# Patient Record
Sex: MALE | Race: WHITE | NOT HISPANIC OR LATINO | Employment: FULL TIME | URBAN - METROPOLITAN AREA
[De-identification: names, ages, dates, MRNs, and addresses within clinical notes are randomized per-mention and may not be internally consistent; named-entity substitution may affect disease eponyms.]

---

## 2022-09-02 ENCOUNTER — TRANSFERRED RECORDS (OUTPATIENT)
Dept: HEALTH INFORMATION MANAGEMENT | Facility: CLINIC | Age: 47
End: 2022-09-02

## 2022-09-02 LAB
ALT SERPL-CCNC: 39 U/L (ref 7–55)
AST SERPL-CCNC: 29 U/L (ref 10–50)
CHOLESTEROL (EXTERNAL): 239 MG/DL (ref 30–199)
CREATININE (EXTERNAL): 0.72 MG/DL (ref 0.8–1.3)
GFR ESTIMATED (EXTERNAL): 113 ML/MIN/1.73M2
GLUCOSE (EXTERNAL): 92 MG/DL (ref 70–199)
HDLC SERPL-MCNC: 65 MG/DL
LDL CHOLESTEROL CALCULATED (EXTERNAL): 154 MG/DL
NON HDL CHOLESTEROL (EXTERNAL): 174 MG/DL
POTASSIUM (EXTERNAL): 4.4 MMOL/L (ref 3.3–4.9)
TRIGLYCERIDES (EXTERNAL): 101 MG/DL
TSH SERPL-ACNC: 1.03 MCIU/ML (ref 0.3–4.2)

## 2023-05-04 ENCOUNTER — OFFICE VISIT (OUTPATIENT)
Dept: INTERNAL MEDICINE | Facility: CLINIC | Age: 48
End: 2023-05-04
Payer: COMMERCIAL

## 2023-05-04 VITALS
OXYGEN SATURATION: 96 % | DIASTOLIC BLOOD PRESSURE: 91 MMHG | SYSTOLIC BLOOD PRESSURE: 142 MMHG | HEIGHT: 70 IN | BODY MASS INDEX: 25.98 KG/M2 | HEART RATE: 68 BPM | WEIGHT: 181.5 LBS

## 2023-05-04 DIAGNOSIS — R03.0 ELEVATED BP WITHOUT DIAGNOSIS OF HYPERTENSION: Primary | ICD-10-CM

## 2023-05-04 DIAGNOSIS — Z80.8 FAMILY HISTORY OF MELANOMA: ICD-10-CM

## 2023-05-04 DIAGNOSIS — Z00.00 HEALTH CARE MAINTENANCE: ICD-10-CM

## 2023-05-04 PROCEDURE — 99386 PREV VISIT NEW AGE 40-64: CPT | Performed by: HOSPITALIST

## 2023-05-04 RX ORDER — TADALAFIL 20 MG/1
20 TABLET ORAL DAILY PRN
COMMUNITY
Start: 2022-12-24

## 2023-05-04 RX ORDER — CETIRIZINE HYDROCHLORIDE 10 MG/1
10 TABLET ORAL DAILY
COMMUNITY

## 2023-05-04 ASSESSMENT — ENCOUNTER SYMPTOMS
BACK PAIN: 0
DIARRHEA: 0
CONSTIPATION: 0
SHORTNESS OF BREATH: 0
NERVOUS/ANXIOUS: 1
DYSURIA: 0
FEVER: 0
ABDOMINAL PAIN: 0
CHILLS: 0

## 2023-05-04 NOTE — PROGRESS NOTES
Assessment/Plan  Problem List Items Addressed This Visit        Other    Health care maintenance     - Recent lipids with ASCVD 10 year risk calculated at 3.3%. Will monitor for now. Next Lipid check again in September or after.   - Patient mentions he will be getting a colonoscopy on June 16th with [facility].   - [place] for prior immunization records.          Family history of melanoma     - Referral to dermatology for skin check.         Relevant Orders    Adult Dermatology Referral    Elevated BP without diagnosis of hypertension - Primary     - Obtain 24 BP monitoring.         Relevant Orders    24 Hour Blood Pressure Monitor - Adult       No results found for any visits on 05/04/23.    Health Maintenance Due   Topic Date Due     YEARLY PREVENTIVE VISIT  Never done     ADVANCE CARE PLANNING  Never done     HEPATITIS B IMMUNIZATION (1 of 3 - 3-dose series) Never done     COVID-19 Vaccine (1) Never done     COLORECTAL CANCER SCREENING  Never done     HIV SCREENING  Never done     HEPATITIS C SCREENING  Never done     DTAP/TDAP/TD IMMUNIZATION (1 - Tdap) Never done     LIPID  Never done     INFLUENZA VACCINE (1) Never done       Subjective  Has a colonoscopy scheduled on June 16th.   Has had elevated cholesterol before. Last checked on 9/2022. Cholesterol 239, Trig 101, , HDL 65.   Seasonal allergies, worse in Spring. Continues on zyrtec.   Exercises with jocking and weight lifting about 3 times a week.   Has had elevation in his blood pressure before. Mentions getting anxiety coming to clinics.   He would like a referral to dermatology due to family history of melanoma with an aunt. Mentions a few moles along his back.   Does mention he has to go to work.       Review of Systems   Constitutional: Negative for chills and fever.   Respiratory: Negative for shortness of breath.    Cardiovascular: Negative for chest pain and peripheral edema.   Gastrointestinal: Negative for abdominal pain, constipation and diarrhea.  "  Genitourinary: Negative for dysuria.   Musculoskeletal: Negative for back pain.   Skin: Negative for rash.   Psychiatric/Behavioral: The patient is nervous/anxious.        History  Past Medical History:   Diagnosis Date     Seasonal allergic rhinitis     Spring is worse     Shingles     once       Past Surgical History:   Procedure Laterality Date     ADENOIDECTOMY       ROTATOR CUFF REPAIR RT/LT Left 01/2020       Family History   Problem Relation Age of Onset     Multiple myeloma Father      Acute myelogenous leukemia Father         secondary to chemotherapy     Lung Cancer Paternal Grandmother         hx of smoking     Bladder Cancer Paternal Grandfather      Melanoma Paternal Aunt        Social History     Tobacco Use     Smoking status: Never     Smokeless tobacco: Never   Vaping Use     Vaping status: Not on file   Substance Use Topics     Alcohol use: Yes     Alcohol/week: 7.0 standard drinks of alcohol     Types: 7 Standard drinks or equivalent per week        Objective  BP (!) 142/91 (BP Location: Right arm, Patient Position: Sitting, Cuff Size: Adult Regular)   Pulse 68   Ht 1.778 m (5' 10\")   Wt 82.3 kg (181 lb 8 oz)   SpO2 96%   BMI 26.04 kg/m    Vitals taken by Tobias Jacques MD    Physical Exam  Constitutional:       General: He is not in acute distress.     Appearance: He is normal weight. He is not ill-appearing or toxic-appearing.   Eyes:      Conjunctiva/sclera: Conjunctivae normal.   Pulmonary:      Effort: Pulmonary effort is normal.   Skin:     General: Skin is warm and dry.   Neurological:      Mental Status: He is alert.   Psychiatric:         Mood and Affect: Mood normal.         Thought Content: Thought content normal.         20 minutes spent on the date of the encounter doing chart review, history and exam, documentation and further activities per the note.      Return in about 3 months (around 8/4/2023).      Tobias Jacques MD  [facility] INTERNAL MEDICINE " [place]    Answers for HPI/ROS submitted by the patient on 5/4/2023  General Symptoms: No  Skin Symptoms: No  HENT Symptoms: No  EYE SYMPTOMS: No  HEART SYMPTOMS: No  LUNG SYMPTOMS: No  INTESTINAL SYMPTOMS: No  URINARY SYMPTOMS: No  REPRODUCTIVE SYMPTOMS: No  SKELETAL SYMPTOMS: No  BLOOD SYMPTOMS: No  NERVOUS SYSTEM SYMPTOMS: No  MENTAL HEALTH SYMPTOMS: No

## 2023-05-04 NOTE — NURSING NOTE
Tony Meng is a 47 year old male that presents in clinic today for the following:     Chief Complaint   Patient presents with     Establish Care     Pt here to establish care with provider.        The patient's allergies and medications were reviewed. The patient's vitals were obtained without incident. The patient does not have any other questions for the provider.     Gail Acharya, EMT at 8:29 AM on 5/4/2023.  Primary Care Clinic: [phone]

## 2023-05-04 NOTE — ASSESSMENT & PLAN NOTE
- Recent lipids with ASCVD 10 year risk calculated at 3.3%. Will monitor for now. Next Lipid check again in September or after.   - Patient mentions he will be getting a colonoscopy on June 16th with [facility].   - [place] for prior immunization records.

## 2023-05-04 NOTE — PATIENT INSTRUCTIONS
- Keep appointment for colonoscopy.   - Monitoring for 24 hours blood pressure for now.   - [place] for prior immunization records.       Follow up again in 3 months.    To schedule an appointment for your 24-hour blood pressure monitor, please call [phone].

## 2023-06-16 ENCOUNTER — TRANSFERRED RECORDS (OUTPATIENT)
Dept: HEALTH INFORMATION MANAGEMENT | Facility: CLINIC | Age: 48
End: 2023-06-16
Payer: COMMERCIAL

## 2023-10-23 ENCOUNTER — MYC MEDICAL ADVICE (OUTPATIENT)
Dept: INTERNAL MEDICINE | Facility: CLINIC | Age: 48
End: 2023-10-23
Payer: COMMERCIAL

## 2023-11-01 ENCOUNTER — VIRTUAL VISIT (OUTPATIENT)
Dept: FAMILY MEDICINE | Facility: CLINIC | Age: 48
End: 2023-11-01
Payer: COMMERCIAL

## 2023-11-01 DIAGNOSIS — I10 ESSENTIAL HYPERTENSION: ICD-10-CM

## 2023-11-01 DIAGNOSIS — Z13.29 SCREENING FOR THYROID DISORDER: ICD-10-CM

## 2023-11-01 DIAGNOSIS — Z12.5 SCREENING FOR PROSTATE CANCER: ICD-10-CM

## 2023-11-01 DIAGNOSIS — E78.5 HYPERLIPIDEMIA LDL GOAL <100: Primary | ICD-10-CM

## 2023-11-01 DIAGNOSIS — Z13.1 SCREENING FOR DIABETES MELLITUS: ICD-10-CM

## 2023-11-01 PROCEDURE — 99213 OFFICE O/P EST LOW 20 MIN: CPT | Mod: VID | Performed by: INTERNAL MEDICINE

## 2023-11-01 NOTE — PROGRESS NOTES
Gregory is a 48 year old who is being evaluated via a billable telephone visit.      How would you like to obtain your AVS? [place]  Distant Location (provider location):  Off-site    Subjective   Gregory is a 48 year old, presenting for the following health issues:  Follow Up      History of Present Illness       Hypertension: He presents for follow up of hypertension.  He does not check blood pressure  regularly outside of the clinic. Outside blood pressures have been over 140/90. He does not follow a low salt diet.     He eats 0-1 servings of fruits and vegetables daily.He consumes 0 sweetened beverage(s) daily.He exercises with enough effort to increase his heart rate 30 to 60 minutes per day.  He exercises with enough effort to increase his heart rate 5 days per week.   He is taking medications regularly.       Current Medications:     Current Outpatient Medications   Medication Sig Dispense Refill     cetirizine (ZYRTEC) 10 MG tablet Take 10 mg by mouth daily       tadalafil (CIALIS) 20 MG tablet Take 20 mg by mouth daily as needed           Allergies:      Allergies   Allergen Reactions     Dog Epithelium Allergy Skin Test Other (See Comments)     Dust Mite Extract Other (See Comments)     Pollen Extract Other (See Comments)            Past Medical History:     Past Medical History:   Diagnosis Date     Seasonal allergic rhinitis     Spring is worse     Shingles     once         Past Surgical History:     Past Surgical History:   Procedure Laterality Date     ADENOIDECTOMY       PILONIDAL CYST / SINUS EXCISION       ROTATOR CUFF REPAIR RT/LT Left 01/2020         Family Medical History:     Family History   Problem Relation Age of Onset     Multiple myeloma Father      Acute myelogenous leukemia Father         secondary to chemotherapy     Lung Cancer Paternal Grandmother         hx of smoking     Bladder Cancer Paternal Grandfather      Melanoma Paternal Aunt          Social History:     Social History      Socioeconomic History     Marital status:      Spouse name: Not on file     Number of children: Not on file     Years of education: Not on file     Highest education level: Not on file   Occupational History     Not on file   Tobacco Use     Smoking status: Former     Types: Cigarettes     Smokeless tobacco: Never     Tobacco comments:     Smoked socially in college.   Substance and Sexual Activity     Alcohol use: Yes     Alcohol/week: 7.0 standard drinks of alcohol     Types: 7 Standard drinks or equivalent per week     Comment: occasional     Drug use: Not Currently     Sexual activity: Not on file   Other Topics Concern     Not on file   Social History Narrative     Not on file     Social Determinants of Health     Financial Resource Strain: Low Risk  (10/26/2023)    Financial Resource Strain      Within the past 12 months, have you or your family members you live with been unable to get utilities (heat, electricity) when it was really needed?: No   Food Insecurity: Low Risk  (10/26/2023)    Food Insecurity      Within the past 12 months, did you worry that your food would run out before you got money to buy more?: No      Within the past 12 months, did the food you bought just not last and you didn t have money to get more?: No   Transportation Needs: Low Risk  (10/26/2023)    Transportation Needs      Within the past 12 months, has lack of transportation kept you from medical appointments, getting your medicines, non-medical meetings or appointments, work, or from getting things that you need?: No   Physical Activity: Not on file   Stress: Not on file   Social Connections: Not on file   Interpersonal Safety: Not on file   Housing Stability: Low Risk  (10/26/2023)    Housing Stability      Do you have housing? : Yes      Are you worried about losing your housing?: No           Review of System:     Constitutional: Negative for fever or chills  Skin: Negative for rashes  Ears/Nose/Throat: Negative for  nasal congestion, sore throat  Respiratory: No shortness of breath, dyspnea on exertion, cough, or hemoptysis  Cardiovascular: Negative for chest pain  Gastrointestinal: Negative for nausea, vomiting  Genitourinary: Negative for dysuria, hematuria  Musculoskeletal: Negative for myalgias  Neurologic: Negative for headaches  Psychiatric: Negative for depression, anxiety  Hematologic/Lymphatic/Immunologic: Negative  Endocrine: Negative  Behavioral: Negative for tobacco use       Physical Exam:   There were no vitals taken for this visit.    RESP: no cough over the phone   NEURO: Alert & Oriented x 3.   PSYCH: mentation appears normal, affect normal        Diagnostic Test Results:     Diagnostic Test Results:  Labs reviewed in Epic    ASSESSMENT/PLAN:       Gregory was seen today for follow up.    Diagnoses and all orders for this visit:    Hyperlipidemia LDL goal <100  -     Adult Cardiology Eval  Referral; Future  -     Lipid panel reflex to direct LDL Fasting; Future    Essential hypertension  -     CBC with platelets and differential; Future  -     Basic metabolic panel  (Ca, Cl, CO2, Creat, Gluc, K, Na, BUN); Future    Screening for prostate cancer  -     PSA, screen; Future    Screening for diabetes mellitus  -     Hemoglobin A1c; Future    Screening for thyroid disorder  -     TSH with free T4 reflex; Future            Follow Up Plan:     Patient is instructed to return to Internal Medicine clinic for follow-up visit in 1 month.    Telephone visit duration including chart review: 21 minutes    Tia Wolf MD  Internal Medicine  [facility]

## 2023-11-03 ENCOUNTER — LAB (OUTPATIENT)
Dept: LAB | Facility: CLINIC | Age: 48
End: 2023-11-03
Payer: COMMERCIAL

## 2023-11-03 DIAGNOSIS — Z12.5 SCREENING FOR PROSTATE CANCER: ICD-10-CM

## 2023-11-03 DIAGNOSIS — I10 ESSENTIAL HYPERTENSION: ICD-10-CM

## 2023-11-03 DIAGNOSIS — Z13.1 SCREENING FOR DIABETES MELLITUS: ICD-10-CM

## 2023-11-03 DIAGNOSIS — Z13.29 SCREENING FOR THYROID DISORDER: ICD-10-CM

## 2023-11-03 DIAGNOSIS — E78.5 HYPERLIPIDEMIA LDL GOAL <100: ICD-10-CM

## 2023-11-03 LAB
ANION GAP SERPL CALCULATED.3IONS-SCNC: 13 MMOL/L (ref 7–15)
BASOPHILS # BLD AUTO: 0 10E3/UL (ref 0–0.2)
BASOPHILS NFR BLD AUTO: 0 %
BUN SERPL-MCNC: 18.3 MG/DL (ref 6–20)
CALCIUM SERPL-MCNC: 9.3 MG/DL (ref 8.6–10)
CHLORIDE SERPL-SCNC: 101 MMOL/L (ref 98–107)
CHOLEST SERPL-MCNC: 240 MG/DL
CREAT SERPL-MCNC: 0.83 MG/DL (ref 0.67–1.17)
DEPRECATED HCO3 PLAS-SCNC: 25 MMOL/L (ref 22–29)
EGFRCR SERPLBLD CKD-EPI 2021: >90 ML/MIN/1.73M2
EOSINOPHIL # BLD AUTO: 0.1 10E3/UL (ref 0–0.7)
EOSINOPHIL NFR BLD AUTO: 1 %
ERYTHROCYTE [DISTWIDTH] IN BLOOD BY AUTOMATED COUNT: 11.8 % (ref 10–15)
GLUCOSE SERPL-MCNC: 111 MG/DL (ref 70–99)
HBA1C MFR BLD: 5.3 % (ref 0–5.6)
HCT VFR BLD AUTO: 42.2 % (ref 40–53)
HDLC SERPL-MCNC: 65 MG/DL
HGB BLD-MCNC: 14.1 G/DL (ref 13.3–17.7)
IMM GRANULOCYTES # BLD: 0 10E3/UL
IMM GRANULOCYTES NFR BLD: 0 %
LDLC SERPL CALC-MCNC: 153 MG/DL
LYMPHOCYTES # BLD AUTO: 1.6 10E3/UL (ref 0.8–5.3)
LYMPHOCYTES NFR BLD AUTO: 24 %
MCH RBC QN AUTO: 29.4 PG (ref 26.5–33)
MCHC RBC AUTO-ENTMCNC: 33.4 G/DL (ref 31.5–36.5)
MCV RBC AUTO: 88 FL (ref 78–100)
MONOCYTES # BLD AUTO: 0.4 10E3/UL (ref 0–1.3)
MONOCYTES NFR BLD AUTO: 7 %
NEUTROPHILS # BLD AUTO: 4.6 10E3/UL (ref 1.6–8.3)
NEUTROPHILS NFR BLD AUTO: 68 %
NONHDLC SERPL-MCNC: 175 MG/DL
PLATELET # BLD AUTO: 228 10E3/UL (ref 150–450)
POTASSIUM SERPL-SCNC: 3.8 MMOL/L (ref 3.4–5.3)
PSA SERPL DL<=0.01 NG/ML-MCNC: 0.65 NG/ML (ref 0–2.5)
RBC # BLD AUTO: 4.8 10E6/UL (ref 4.4–5.9)
SODIUM SERPL-SCNC: 139 MMOL/L (ref 135–145)
TRIGL SERPL-MCNC: 109 MG/DL
TSH SERPL DL<=0.005 MIU/L-ACNC: 1.7 UIU/ML (ref 0.3–4.2)
WBC # BLD AUTO: 6.8 10E3/UL (ref 4–11)

## 2023-11-03 PROCEDURE — 80048 BASIC METABOLIC PNL TOTAL CA: CPT

## 2023-11-03 PROCEDURE — 85025 COMPLETE CBC W/AUTO DIFF WBC: CPT

## 2023-11-03 PROCEDURE — 83036 HEMOGLOBIN GLYCOSYLATED A1C: CPT

## 2023-11-03 PROCEDURE — 84443 ASSAY THYROID STIM HORMONE: CPT

## 2023-11-03 PROCEDURE — 80061 LIPID PANEL: CPT

## 2023-11-03 PROCEDURE — G0103 PSA SCREENING: HCPCS

## 2023-11-03 PROCEDURE — 36415 COLL VENOUS BLD VENIPUNCTURE: CPT

## 2023-11-17 ENCOUNTER — APPOINTMENT (OUTPATIENT)
Dept: URBAN - METROPOLITAN AREA CLINIC 255 | Age: 48
Setting detail: DERMATOLOGY
End: 2023-11-17

## 2023-11-17 VITALS — WEIGHT: 175 LBS | HEIGHT: 70 IN

## 2023-11-17 DIAGNOSIS — L72.0 EPIDERMAL CYST: ICD-10-CM

## 2023-11-17 DIAGNOSIS — D22 MELANOCYTIC NEVI: ICD-10-CM

## 2023-11-17 DIAGNOSIS — D49.2 NEOPLASM OF UNSPECIFIED BEHAVIOR OF BONE, SOFT TISSUE, AND SKIN: ICD-10-CM

## 2023-11-17 DIAGNOSIS — L81.4 OTHER MELANIN HYPERPIGMENTATION: ICD-10-CM

## 2023-11-17 DIAGNOSIS — D18.0 HEMANGIOMA: ICD-10-CM

## 2023-11-17 DIAGNOSIS — L57.8 OTHER SKIN CHANGES DUE TO CHRONIC EXPOSURE TO NONIONIZING RADIATION: ICD-10-CM

## 2023-11-17 DIAGNOSIS — L82.1 OTHER SEBORRHEIC KERATOSIS: ICD-10-CM

## 2023-11-17 DIAGNOSIS — Z71.89 OTHER SPECIFIED COUNSELING: ICD-10-CM

## 2023-11-17 PROBLEM — D23.72 OTHER BENIGN NEOPLASM OF SKIN OF LEFT LOWER LIMB, INCLUDING HIP: Status: ACTIVE | Noted: 2023-11-17

## 2023-11-17 PROBLEM — D22.5 MELANOCYTIC NEVI OF TRUNK: Status: ACTIVE | Noted: 2023-11-17

## 2023-11-17 PROBLEM — D18.01 HEMANGIOMA OF SKIN AND SUBCUTANEOUS TISSUE: Status: ACTIVE | Noted: 2023-11-17

## 2023-11-17 PROCEDURE — 11102 TANGNTL BX SKIN SINGLE LES: CPT

## 2023-11-17 PROCEDURE — OTHER PHOTO-DOCUMENTATION: OTHER

## 2023-11-17 PROCEDURE — OTHER MIPS QUALITY: OTHER

## 2023-11-17 PROCEDURE — 99203 OFFICE O/P NEW LOW 30 MIN: CPT | Mod: 25

## 2023-11-17 PROCEDURE — OTHER SEPARATE AND IDENTIFIABLE DOCUMENTATION: OTHER

## 2023-11-17 PROCEDURE — OTHER REASSURANCE: OTHER

## 2023-11-17 PROCEDURE — OTHER COUNSELING: OTHER

## 2023-11-17 PROCEDURE — OTHER EDUCATIONAL RESOURCES PROVIDED: OTHER

## 2023-11-17 PROCEDURE — OTHER BIOPSY BY SHAVE METHOD: OTHER

## 2023-11-17 ASSESSMENT — LOCATION ZONE DERM
LOCATION ZONE: LEG
LOCATION ZONE: TRUNK

## 2023-11-17 ASSESSMENT — LOCATION DETAILED DESCRIPTION DERM
LOCATION DETAILED: RIGHT MEDIAL INFERIOR CHEST
LOCATION DETAILED: LEFT SUPERIOR MEDIAL UPPER BACK
LOCATION DETAILED: LEFT MEDIAL UPPER BACK
LOCATION DETAILED: RIGHT ANTERIOR DISTAL THIGH

## 2023-11-17 ASSESSMENT — LOCATION SIMPLE DESCRIPTION DERM
LOCATION SIMPLE: CHEST
LOCATION SIMPLE: RIGHT THIGH
LOCATION SIMPLE: LEFT UPPER BACK

## 2023-11-17 NOTE — PROCEDURE: REASSURANCE
Detail Level: Detailed
Hide Additional Notes?: No
Additional Notes (Optional): Informed the patient growth was an epidermal inclusion cyst & was benign. Discussed W/M vs removal if bothersome. He elected to W/M for now.

## 2023-11-17 NOTE — PROCEDURE: MIPS QUALITY
Quality 130: Documentation Of Current Medications In The Medical Record: Current Medications Documented
Detail Level: Detailed
Quality 431: Preventive Care And Screening: Unhealthy Alcohol Use - Screening: Patient not screened for unhealthy alcohol use using a systematic screening method
Quality 110: Preventive Care And Screening: Influenza Immunization: Influenza Immunization Administered during Influenza season
Quality 226: Preventive Care And Screening: Tobacco Use: Screening And Cessation Intervention: Patient screened for tobacco use and is an ex/non-smoker

## 2023-11-28 ENCOUNTER — OFFICE VISIT (OUTPATIENT)
Dept: CARDIOLOGY | Facility: CLINIC | Age: 48
End: 2023-11-28
Payer: COMMERCIAL

## 2023-11-28 VITALS
HEART RATE: 79 BPM | WEIGHT: 177.6 LBS | BODY MASS INDEX: 25.43 KG/M2 | DIASTOLIC BLOOD PRESSURE: 87 MMHG | HEIGHT: 70 IN | SYSTOLIC BLOOD PRESSURE: 136 MMHG

## 2023-11-28 DIAGNOSIS — R03.0 ELEVATED BLOOD PRESSURE READING WITHOUT DIAGNOSIS OF HYPERTENSION: Primary | ICD-10-CM

## 2023-11-28 DIAGNOSIS — E78.5 HYPERLIPIDEMIA LDL GOAL <100: ICD-10-CM

## 2023-11-28 PROCEDURE — 93000 ELECTROCARDIOGRAM COMPLETE: CPT | Performed by: INTERNAL MEDICINE

## 2023-11-28 PROCEDURE — 99204 OFFICE O/P NEW MOD 45 MIN: CPT | Performed by: INTERNAL MEDICINE

## 2023-11-28 NOTE — LETTER
11/28/2023    Tobias Jacques MD  [address] [address]    RE: Tony Meng       Dear Colleague,     I had the pleasure of seeing Tony Meng in the [facility].  CARDIOLOGY CLINIC CONSULTATION      REASON FOR CONSULT:   Dyslipidemia, elevated BP    PRIMARY CARE PHYSICIAN:  Tobias Jacques        History of Present Illness  Tony Meng is an extremely pleasant 48 year old male her for discussion of his BP and cholesterol.  His medical history is significant only for dyslipidemia and elevated BP without diagnosis of HTN.  He has no family history of heart disease outside of his father, who has heart failure which is likely related to aggressive therapy for multiple myeloma (including reportedly 5 bone marrow transplants).  He is a never smoker.  He drinks ~ 5 drinks per day on the weekends, but not during the week.  He works in supply chain management.  His current job is less stressful, but prior jobs had been very stressful.    He currently has no cardiac symptoms.  He is here with his wife to discuss preventive cardiac health.  His BP today is 136/87 in clinic.  The last few clinic measurements have been high.  A home BP cuff that he had reportedly showed normal BP's, but his wife is not sure of its accuracy.  He also has had high cholesterol numbers for the past 5 years or so, despite his efforts to cut down red meat, etc.... in his diet.    ECG in clinic today showed generous QRS voltage, but no strict criteria for LVH.  Lipid panel from 11/3/23 showed , HDL 65, , and .  His most recent BMP, TSH, A1c, Hgb, and Plts were normal.      Assessment & Plan    Dyslipidemia  Elevated BP without diagnosis of HTN  Never smoker      It was a pleasure to speak with Gregory and his wife in clinic today.  We talked about options for more accurate risk assessment to guide whether or not to treat his BP and cholesterol.  Ultimately, I recommended a 24  hour BP cuff and coronary calcium scan, which they are in agreement.  If these are entirely normal, we do not need to meet up again to discuss.  However, if they are significantly abnormal, particularly if we are considering starting medication, we will have him return to clinic for another visit.      - 24 hour BP cuff  - Coronary calcium scan  - Heart healthy diet, regular aerobic exercise  - Further follow-up if needed based on test results as above      On the date of the patient's visit, I spent a total of 45 minutes reviewing the patient's chart; interviewing, examining, and counseling the patient; coordinating with other providers as necessary, entering orders, and documenting in the medical chart.      Melo Bermudez MD  Interventional Cardiology  November 28, 2023        Medications  Current Outpatient Medications   Medication    cetirizine (ZYRTEC) 10 MG tablet    tadalafil (CIALIS) 20 MG tablet     No current facility-administered medications for this visit.     Allergies  Allergies   Allergen Reactions    Dog Epithelium Allergy Skin Test Other (See Comments)    Dust Mite Extract Other (See Comments)    Pollen Extract Other (See Comments)         Physical Exam      BP: 136/87 Pulse: 79            Vital Signs with Ranges  Pulse:  [79] 79  BP: (136)/(87) 136/87  177 lbs 9.6 oz    Constitutional:  Well-appearing, NAD  Respiratory: Normal respiratory effort, CTAB  Cardiovascular: RRR, no m/r/g.  JVP < 7 cm H2O.  There is no LE edema.  Normal carotid upstrokes, no carotid bruits.      Thank you for allowing me to participate in the care of your patient.      Sincerely,     Melo Bermudez MD     [facility]  cc:   Tia Wolf MD  [address] [facility] [address]  [place] [address]

## 2023-11-28 NOTE — PROGRESS NOTES
CARDIOLOGY CLINIC CONSULTATION      REASON FOR CONSULT:   Dyslipidemia, elevated BP    PRIMARY CARE PHYSICIAN:  Tobias Jacques        History of Present Illness   Tony Meng is an extremely pleasant 48 year old male her for discussion of his BP and cholesterol.  His medical history is significant only for dyslipidemia and elevated BP without diagnosis of HTN.  He has no family history of heart disease outside of his father, who has heart failure which is likely related to aggressive therapy for multiple myeloma (including reportedly 5 bone marrow transplants).  He is a never smoker.  He drinks ~ 5 drinks per day on the weekends, but not during the week.  He works in supply chain management.  His current job is less stressful, but prior jobs had been very stressful.    He currently has no cardiac symptoms.  He is here with his wife to discuss preventive cardiac health.  His BP today is 136/87 in clinic.  The last few clinic measurements have been high.  A home BP cuff that he had reportedly showed normal BP's, but his wife is not sure of its accuracy.  He also has had high cholesterol numbers for the past 5 years or so, despite his efforts to cut down red meat, etc.... in his diet.    ECG in clinic today showed generous QRS voltage, but no strict criteria for LVH.  Lipid panel from 11/3/23 showed , HDL 65, , and .  His most recent BMP, TSH, A1c, Hgb, and Plts were normal.      Assessment & Plan     Dyslipidemia  Elevated BP without diagnosis of HTN  Never smoker      It was a pleasure to speak with Gregory and his wife in clinic today.  We talked about options for more accurate risk assessment to guide whether or not to treat his BP and cholesterol.  Ultimately, I recommended a 24 hour BP cuff and coronary calcium scan, which they are in agreement.  If these are entirely normal, we do not need to meet up again to discuss.  However, if they are significantly abnormal, particularly if we are  considering starting medication, we will have him return to clinic for another visit.      - 24 hour BP cuff  - Coronary calcium scan  - Heart healthy diet, regular aerobic exercise  - Further follow-up if needed based on test results as above      On the date of the patient's visit, I spent a total of 45 minutes reviewing the patient's chart; interviewing, examining, and counseling the patient; coordinating with other providers as necessary, entering orders, and documenting in the medical chart.      Melo Bermudez MD  Interventional Cardiology  November 28, 2023        Medications   Current Outpatient Medications   Medication    cetirizine (ZYRTEC) 10 MG tablet    tadalafil (CIALIS) 20 MG tablet     No current facility-administered medications for this visit.     Allergies   Allergies   Allergen Reactions    Dog Epithelium Allergy Skin Test Other (See Comments)    Dust Mite Extract Other (See Comments)    Pollen Extract Other (See Comments)         Physical Exam       BP: 136/87 Pulse: 79            Vital Signs with Ranges  Pulse:  [79] 79  BP: (136)/(87) 136/87  177 lbs 9.6 oz    Constitutional:  Well-appearing, NAD  Respiratory: Normal respiratory effort, CTAB  Cardiovascular: RRR, no m/r/g.  JVP < 7 cm H2O.  There is no LE edema.  Normal carotid upstrokes, no carotid bruits.

## 2023-12-31 ENCOUNTER — HEALTH MAINTENANCE LETTER (OUTPATIENT)
Age: 48
End: 2023-12-31

## 2024-01-26 ENCOUNTER — HOSPITAL ENCOUNTER (OUTPATIENT)
Dept: CARDIOLOGY | Facility: CLINIC | Age: 49
Discharge: HOME | End: 2024-01-26
Attending: INTERNAL MEDICINE
Payer: COMMERCIAL

## 2024-01-26 DIAGNOSIS — R03.0 ELEVATED BLOOD PRESSURE READING WITHOUT DIAGNOSIS OF HYPERTENSION: ICD-10-CM

## 2024-01-26 DIAGNOSIS — E78.5 HYPERLIPIDEMIA LDL GOAL <100: ICD-10-CM

## 2024-01-26 PROCEDURE — 93786 AMBL BP MNTR W/SW REC ONLY: CPT

## 2024-01-26 PROCEDURE — 75571 CT HRT W/O DYE W/CA TEST: CPT

## 2024-01-26 PROCEDURE — 75571 CT HRT W/O DYE W/CA TEST: CPT | Mod: 26 | Performed by: INTERNAL MEDICINE

## 2024-01-26 PROCEDURE — 93790 AMBL BP MNTR W/SW I&R: CPT | Performed by: INTERNAL MEDICINE

## 2024-02-05 ENCOUNTER — TELEPHONE (OUTPATIENT)
Dept: CARDIOLOGY | Facility: CLINIC | Age: 49
End: 2024-02-05
Payer: COMMERCIAL

## 2024-02-05 NOTE — TELEPHONE ENCOUNTER
CT Calcium score result-   CT shows coronary calcium score of 68 (primarily in the LAD), which places him in the 87th percentile for age and gender.  Accordingly, I do think that it would be a good idea to start a baby aspirin and a moderate intensity statin (atorvastatin 20 mg daily).  If he is okay doing this, we can start this as an outpatient and check a repeat lipid panel/ALT in 2 to 3 months.  If he would like to discuss this more before starting the medication, please schedule him for a nonurgent JEREMIAS visit to do so.     Thanks,   Jamal     24 hour blood pressure monitor-   24-hour BP cuff shows average BP of 128/88 while awake, and 94/59 while asleep (overall average of 118/79).  Given his results, I think that an initial attempt at lifestyle interventions, particularly working on reducing sodium intake and stress levels, is reasonable.  However, I would suggest that he continue to track his home blood pressures on his own machine and see if they are improving with lifestyle interventions, because given the coronary calcification we see on his calcium scan (see separate result note), we want to be sure to control all of his risk factors such as his blood pressure as well as possible, and if his BP starts to get worse, we want to act on this quickly.     Tried to call patient to review results above. Patient answered and requested that RN call back and leave detailed VM as he was unable to speak. Called back and left VM with results and requested a call back to team 4 at [phone] to let us know if he wanted to start ASA and Atorvastatin or discuss further first. [employer] message also was sent.

## 2024-07-08 DIAGNOSIS — I25.84 CORONARY ARTERY CALCIFICATION OF NATIVE ARTERY: ICD-10-CM

## 2024-07-08 DIAGNOSIS — I25.10 CORONARY ARTERY CALCIFICATION OF NATIVE ARTERY: ICD-10-CM

## 2024-07-08 RX ORDER — ATORVASTATIN CALCIUM 20 MG/1
20 TABLET, FILM COATED ORAL DAILY
Qty: 90 TABLET | Refills: 0 | Status: SHIPPED | OUTPATIENT
Start: 2024-07-08

## 2024-07-08 NOTE — TELEPHONE ENCOUNTER
[facility] Cardiology Refill Guideline reviewed.  Medication does not meet criteria for refill due to patient is due for a lipid panel.  Messaged to providers team for further review.  Received refill request from [facility].

## 2024-07-08 NOTE — TELEPHONE ENCOUNTER
Pt due for fasting labs. [employer] message sent to patient to call scheduling to set up. 90 day supply sent.

## 2024-09-06 ENCOUNTER — LAB (OUTPATIENT)
Dept: LAB | Facility: CLINIC | Age: 49
End: 2024-09-06
Payer: COMMERCIAL

## 2024-09-06 DIAGNOSIS — I25.10 CORONARY ARTERY CALCIFICATION OF NATIVE ARTERY: ICD-10-CM

## 2024-09-06 DIAGNOSIS — E78.5 HYPERLIPIDEMIA LDL GOAL <100: ICD-10-CM

## 2024-09-06 DIAGNOSIS — I25.84 CORONARY ARTERY CALCIFICATION OF NATIVE ARTERY: ICD-10-CM

## 2024-09-06 LAB
ALT SERPL W P-5'-P-CCNC: 35 U/L (ref 0–70)
CHOLEST SERPL-MCNC: 154 MG/DL
FASTING STATUS PATIENT QL REPORTED: YES
HDLC SERPL-MCNC: 58 MG/DL
LDLC SERPL CALC-MCNC: 82 MG/DL
NONHDLC SERPL-MCNC: 96 MG/DL
TRIGL SERPL-MCNC: 70 MG/DL

## 2024-09-06 PROCEDURE — 36415 COLL VENOUS BLD VENIPUNCTURE: CPT

## 2024-09-06 PROCEDURE — 80061 LIPID PANEL: CPT

## 2024-09-06 PROCEDURE — 84460 ALANINE AMINO (ALT) (SGPT): CPT

## 2024-10-22 DIAGNOSIS — I25.84 CORONARY ARTERY CALCIFICATION OF NATIVE ARTERY: ICD-10-CM

## 2024-10-22 DIAGNOSIS — I25.10 CORONARY ARTERY CALCIFICATION OF NATIVE ARTERY: ICD-10-CM

## 2024-10-22 RX ORDER — ATORVASTATIN CALCIUM 20 MG/1
20 TABLET, FILM COATED ORAL DAILY
Qty: 90 TABLET | Refills: 0 | Status: SHIPPED | OUTPATIENT
Start: 2024-10-22

## 2024-12-09 ENCOUNTER — VIRTUAL VISIT (OUTPATIENT)
Dept: INTERNAL MEDICINE | Facility: CLINIC | Age: 49
End: 2024-12-09
Payer: COMMERCIAL

## 2024-12-09 ENCOUNTER — MYC MEDICAL ADVICE (OUTPATIENT)
Dept: INTERNAL MEDICINE | Facility: CLINIC | Age: 49
End: 2024-12-09

## 2024-12-09 DIAGNOSIS — R03.0 ELEVATED BP WITHOUT DIAGNOSIS OF HYPERTENSION: ICD-10-CM

## 2024-12-09 DIAGNOSIS — N52.9 ERECTILE DYSFUNCTION, UNSPECIFIED ERECTILE DYSFUNCTION TYPE: Primary | ICD-10-CM

## 2024-12-09 RX ORDER — TADALAFIL 20 MG/1
20 TABLET ORAL DAILY PRN
Qty: 30 TABLET | Refills: 11 | Status: SHIPPED | OUTPATIENT
Start: 2024-12-09

## 2024-12-09 NOTE — ASSESSMENT & PLAN NOTE
Daily aspirin started for preventive cardiology purposes. BP elevated at times, but amb cuff average was reasonable (reviewed this from earlier in 2024).       ASSESSMENT and PLAN:  I suspect he will need antiHTN meds at some point in the next couple of years, but this isn't necessary now, and there is no reason for alarm with the erectile dysfunction treatment plan.

## 2024-12-09 NOTE — PATIENT INSTRUCTIONS
Thank you for visiting the Primary Care Center today at the [facility]! The following is some information about our clinic:     Primary Care Center Frequently-Asked Questions    (1) How do I schedule appointments at the [facility]?     Primary Care--to schedule or make changes to an existing appointment, please call our primary care line at [phone].    Labs--to schedule a lab appointment at the [facility] you can use [employer] or call [phone]. If you have a [place] location that is closer to home, you can reach out to that location for scheduling options.     Imaging--if you need to schedule a CT, X-ray, MRI, ultrasound, or other imaging study you can call [phone] to schedule at the [facility] or any other [facility] imaging location.     Referrals--if a referral to another specialty was ordered you can expect a phone call from their scheduling team. If you have not heard from them in a week, please call us or send us a [employer] message to check the status or get a scheduling number. Please note that this only applies to internal [facility] referrals. If the referral is external you would need to contact their office for scheduling.     (2) I have a question about my visit, who do I contact?     You can call us at the primary care line at [phone] to ask questions about your visit. You can also send a secure message through [employer], which is reviewed by clinic staff. Please note that [employer] messages have a twenty-four to forty-eight business hour turnaround time and should not be used for urgent concerns.    (3) How will I get the results of my tests?    If you are signed up for [employer] all tests will be released to you within twenty-four hours of resulting. Please allow three to five days for your doctor to review your results and place a note interpreting the results. If you do not have [employer] you will receive your  results through mail seven to ten business days following the return of the tests. Please note that if there should be any urgent or concerning results that your doctor or their registered nurse will reach out to you the same day as the tests come back. If you have follow up questions about your results or would like to discuss the results in detail please schedule a follow up with your provider either in person or virtually.     (4) How do I get refills of my prescriptions?     You should always first contact your pharmacy for refills of your medications. If submitting a refill request on [employer], please be sure to submit the request only once--repeat requests can cause delays in refill. If you are requesting a NEW medication or a medication related to new symptoms you will need to schedule an appointment with a provider prior to approval. Please note: Routine medication refills have up to one to three business day turnaround whereas controlled substances refills have up to five to seven business day turnaround.    (5) I have new symptoms, what do I do?     If you are having an immediate medical emergency, you should dial 911 for assistance.   For anything urgent that needs to be seen within a few hours to one day you should visit a local urgent care for assistance.  For non-urgent symptoms that need to be seen within a few days to a week you can schedule with an available provider in primary care by going to [employer] or calling [phone].   If you are not sure how serious your symptoms are or you would like to receive medical advice you can always call [phone] to speak with a triage nurse.

## 2024-12-09 NOTE — PROGRESS NOTES
"Dear patient. Thank you for visiting with me. I want you to feel respected, understood, and empowered. \"Respect\" is valuing you as much as I would a close family member. \"Empowerment\" happens when you are fully informed, and can make the best possible decision for you.  Please ask me questions!  Challenge anything that is not clear.    Medical records are primarily used as memory aids for me and my colleagues. Things to know about my documentation style:  - The 'problem list' includes current symptoms or diagnoses, and some problems that are resolved but may return. I use the past medical history for problems not expected to return.  - I use single quotation marks for things that you or I said, when I want to clarify who was speaking.  - I use double quotation marks when copying a term from elsewhere in your records. Italics (besides here) may also denote a quotation.  If you have questions or concerns, please contact me; I will reply as soon as time allows.    Context    PCP: Tobias Jacques   Visit type: problem-oriented    Tony Meng is a 49 year old man, with concerns including:  Chief Complaint   Patient presents with    RECHECK    Recheck Medication       History, update, and/or problems      Problem List as of 12/9/2024 Reviewed: 12/9/2024  8:53 AM by Juice Ly MD            Noted    1. Erectile dysfunction, unspecified erectile dysfunction type - Primary 12/9/2024     Last Assessment & Plan 12/9/2024 Virtual Visit Written 12/9/2024  8:53 AM by Juice Ly MD      Needs to restart prescription. He has actually had enough for a while, due to larger Rx than used at points in the past, and sometimes has broken the pill in half. Uses 1-2/wk.       ASSESSMENT and PLAN: I rewrote the prescription, will have to see what insurance allows.          Relevant Medications     tadalafil (CIALIS) 20 MG tablet    2. Elevated BP without diagnosis of hypertension 5/4/2023     Last " Assessment & Plan 12/9/2024 Virtual Visit Edited 12/9/2024  8:55 AM by Juice Ly MD      Daily aspirin started for preventive cardiology purposes. BP elevated at times, but amb cuff average was reasonable (reviewed this from earlier in 2024).       ASSESSMENT and PLAN:  I suspect he will need antiHTN meds at some point in the next couple of years, but this isn't necessary now, and there is no reason for alarm with the erectile dysfunction treatment plan.            Start Time: 8:39 AM  End Time:8:50 AM      Gregory is a 49 year old who is being evaluated via a billable video visit.    How would you like to obtain your AVS? MyChart  If the video visit is dropped, the invitation should be resent by: Text to cell phone: [phone]  Will anyone else be joining your video visit? No          Subjective   Gregory is a 49 year old, presenting for the following health issues:  RECHECK and Recheck Medication    History of Present Illness       Reason for visit:  Medication refills    He eats 0-1 servings of fruits and vegetables daily.He consumes 0 sweetened beverage(s) daily.He exercises with enough effort to increase his heart rate 30 to 60 minutes per day.  He exercises with enough effort to increase his heart rate 5 days per week.   He is taking medications regularly.                   Objective    Vitals - Patient Reported  Pain Score: No Pain (0)      Vitals:  No vitals were obtained today due to virtual visit.    Physical Exam  Constitutional:       Appearance: Normal appearance.   HENT:      Head: Normocephalic.   Eyes:      General: No scleral icterus.        Right eye: No discharge.         Left eye: No discharge.      Extraocular Movements: Extraocular movements intact.   Pulmonary:      Effort: No respiratory distress.   Skin:     Comments: No apparent abnormalities in exposed skin   Neurological:      Mental Status: He is alert.   Psychiatric:         Attention and Perception: Attention normal.          Mood and Affect: Mood normal.         Speech: Speech normal.         Behavior: Behavior normal.                  Video-Visit Details    Type of service:  Video Visit   Originating Location (pt. Location): Home    Distant Location (provider location):  Off-site  Platform used for Video Visit: Well  Signed Electronically by: Juice Ly MD

## 2024-12-09 NOTE — ASSESSMENT & PLAN NOTE
Needs to restart prescription. He has actually had enough for a while, due to larger Rx than used at points in the past, and sometimes has broken the pill in half. Uses 1-2/wk.       ASSESSMENT and PLAN: I rewrote the prescription, will have to see what insurance allows.

## 2025-02-10 ENCOUNTER — TELEPHONE (OUTPATIENT)
Dept: CARDIOLOGY | Facility: CLINIC | Age: 50
End: 2025-02-10
Payer: COMMERCIAL

## 2025-02-10 DIAGNOSIS — I25.10 CORONARY ARTERY CALCIFICATION OF NATIVE ARTERY: ICD-10-CM

## 2025-02-10 DIAGNOSIS — I25.84 CORONARY ARTERY CALCIFICATION OF NATIVE ARTERY: ICD-10-CM

## 2025-02-10 DIAGNOSIS — E78.5 HYPERLIPIDEMIA LDL GOAL <100: Primary | ICD-10-CM

## 2025-02-10 RX ORDER — ATORVASTATIN CALCIUM 20 MG/1
20 TABLET, FILM COATED ORAL DAILY
Qty: 90 TABLET | Refills: 0 | Status: SHIPPED | OUTPATIENT
Start: 2025-02-10

## 2025-02-10 NOTE — TELEPHONE ENCOUNTER
[facility] Cardiology Refill Guideline reviewed.  Medication does not meet criteria for refill due to overdue for follow up.  Messaged to providers team for further review.

## 2025-02-10 NOTE — LETTER
February 10, 2025       TO: Tony Meng  [address] [place] [address]       Dear Tony Meng,    You have requested a medication refill and our records indicate that you have not been seen by one of our providers for your annual office visit.  We will refill your medication for 3 months, which will allow you enough time to be seen by one of our providers.    Please call our clinic at [phone] to schedule your appointment as soon as possible.    Thank you,    [facility]

## 2025-02-10 NOTE — TELEPHONE ENCOUNTER
No letters have been mailed informing patient overdue for follow up. Letter mailed. 90 day refill sent. Cardiology follow up ordered.

## 2025-02-20 SDOH — HEALTH STABILITY: PHYSICAL HEALTH: ON AVERAGE, HOW MANY DAYS PER WEEK DO YOU ENGAGE IN MODERATE TO STRENUOUS EXERCISE (LIKE A BRISK WALK)?: 5 DAYS

## 2025-02-20 SDOH — HEALTH STABILITY: PHYSICAL HEALTH: ON AVERAGE, HOW MANY MINUTES DO YOU ENGAGE IN EXERCISE AT THIS LEVEL?: 40 MIN

## 2025-02-20 ASSESSMENT — SOCIAL DETERMINANTS OF HEALTH (SDOH): HOW OFTEN DO YOU GET TOGETHER WITH FRIENDS OR RELATIVES?: TWICE A WEEK

## 2025-02-25 ENCOUNTER — OFFICE VISIT (OUTPATIENT)
Dept: FAMILY MEDICINE | Facility: CLINIC | Age: 50
End: 2025-02-25
Payer: COMMERCIAL

## 2025-02-25 VITALS
WEIGHT: 175.7 LBS | TEMPERATURE: 98.1 F | DIASTOLIC BLOOD PRESSURE: 78 MMHG | RESPIRATION RATE: 16 BRPM | BODY MASS INDEX: 25.15 KG/M2 | HEART RATE: 65 BPM | HEIGHT: 70 IN | OXYGEN SATURATION: 98 % | SYSTOLIC BLOOD PRESSURE: 118 MMHG

## 2025-02-25 DIAGNOSIS — E78.5 HYPERLIPIDEMIA LDL GOAL <100: ICD-10-CM

## 2025-02-25 DIAGNOSIS — Z13.1 SCREENING FOR DIABETES MELLITUS: ICD-10-CM

## 2025-02-25 DIAGNOSIS — Z12.5 SCREENING FOR PROSTATE CANCER: ICD-10-CM

## 2025-02-25 DIAGNOSIS — I25.10 CORONARY ARTERY CALCIFICATION OF NATIVE ARTERY: ICD-10-CM

## 2025-02-25 DIAGNOSIS — Z00.00 ANNUAL PHYSICAL EXAM: Primary | ICD-10-CM

## 2025-02-25 DIAGNOSIS — I25.84 CORONARY ARTERY CALCIFICATION OF NATIVE ARTERY: ICD-10-CM

## 2025-02-25 DIAGNOSIS — R03.0 ELEVATED BP WITHOUT DIAGNOSIS OF HYPERTENSION: ICD-10-CM

## 2025-02-25 DIAGNOSIS — Z11.59 NEED FOR HEPATITIS C SCREENING TEST: ICD-10-CM

## 2025-02-25 DIAGNOSIS — Z23 NEED FOR VACCINATION: ICD-10-CM

## 2025-02-25 DIAGNOSIS — Z11.4 SCREENING FOR HIV (HUMAN IMMUNODEFICIENCY VIRUS): ICD-10-CM

## 2025-02-25 LAB
ALBUMIN SERPL BCG-MCNC: 4.9 G/DL (ref 3.5–5.2)
ALP SERPL-CCNC: 68 U/L (ref 40–150)
ALT SERPL W P-5'-P-CCNC: 39 U/L (ref 0–70)
ANION GAP SERPL CALCULATED.3IONS-SCNC: 11 MMOL/L (ref 7–15)
AST SERPL W P-5'-P-CCNC: 30 U/L (ref 0–45)
BILIRUB SERPL-MCNC: 0.9 MG/DL
BUN SERPL-MCNC: 14.5 MG/DL (ref 6–20)
CALCIUM SERPL-MCNC: 10.2 MG/DL (ref 8.8–10.4)
CHLORIDE SERPL-SCNC: 101 MMOL/L (ref 98–107)
CHOLEST SERPL-MCNC: 180 MG/DL
CREAT SERPL-MCNC: 0.8 MG/DL (ref 0.67–1.17)
EGFRCR SERPLBLD CKD-EPI 2021: >90 ML/MIN/1.73M2
ERYTHROCYTE [DISTWIDTH] IN BLOOD BY AUTOMATED COUNT: 11.9 % (ref 10–15)
EST. AVERAGE GLUCOSE BLD GHB EST-MCNC: 108 MG/DL
FASTING STATUS PATIENT QL REPORTED: YES
FASTING STATUS PATIENT QL REPORTED: YES
GLUCOSE SERPL-MCNC: 105 MG/DL (ref 70–99)
HBA1C MFR BLD: 5.4 % (ref 0–5.6)
HCO3 SERPL-SCNC: 28 MMOL/L (ref 22–29)
HCT VFR BLD AUTO: 46.6 % (ref 40–53)
HCV AB SERPL QL IA: NONREACTIVE
HDLC SERPL-MCNC: 71 MG/DL
HGB BLD-MCNC: 15.2 G/DL (ref 13.3–17.7)
HIV 1+2 AB+HIV1 P24 AG SERPL QL IA: NONREACTIVE
LDLC SERPL CALC-MCNC: 93 MG/DL
MCH RBC QN AUTO: 28.9 PG (ref 26.5–33)
MCHC RBC AUTO-ENTMCNC: 32.6 G/DL (ref 31.5–36.5)
MCV RBC AUTO: 89 FL (ref 78–100)
NONHDLC SERPL-MCNC: 109 MG/DL
PLATELET # BLD AUTO: 256 10E3/UL (ref 150–450)
POTASSIUM SERPL-SCNC: 4.9 MMOL/L (ref 3.4–5.3)
PROT SERPL-MCNC: 7.6 G/DL (ref 6.4–8.3)
PSA SERPL DL<=0.01 NG/ML-MCNC: 0.6 NG/ML (ref 0–3.5)
RBC # BLD AUTO: 5.26 10E6/UL (ref 4.4–5.9)
SODIUM SERPL-SCNC: 140 MMOL/L (ref 135–145)
TRIGL SERPL-MCNC: 79 MG/DL
WBC # BLD AUTO: 4.1 10E3/UL (ref 4–11)

## 2025-02-25 PROCEDURE — 36415 COLL VENOUS BLD VENIPUNCTURE: CPT

## 2025-02-25 PROCEDURE — 99396 PREV VISIT EST AGE 40-64: CPT | Mod: 25

## 2025-02-25 PROCEDURE — 87389 HIV-1 AG W/HIV-1&-2 AB AG IA: CPT

## 2025-02-25 PROCEDURE — 90472 IMMUNIZATION ADMIN EACH ADD: CPT

## 2025-02-25 PROCEDURE — 80061 LIPID PANEL: CPT

## 2025-02-25 PROCEDURE — G0103 PSA SCREENING: HCPCS

## 2025-02-25 PROCEDURE — 85027 COMPLETE CBC AUTOMATED: CPT

## 2025-02-25 PROCEDURE — 99214 OFFICE O/P EST MOD 30 MIN: CPT | Mod: 25

## 2025-02-25 PROCEDURE — 86803 HEPATITIS C AB TEST: CPT

## 2025-02-25 PROCEDURE — 90746 HEPB VACCINE 3 DOSE ADULT IM: CPT

## 2025-02-25 PROCEDURE — 90471 IMMUNIZATION ADMIN: CPT

## 2025-02-25 PROCEDURE — 83036 HEMOGLOBIN GLYCOSYLATED A1C: CPT

## 2025-02-25 PROCEDURE — 90677 PCV20 VACCINE IM: CPT

## 2025-02-25 PROCEDURE — 80053 COMPREHEN METABOLIC PANEL: CPT

## 2025-02-25 RX ORDER — ATORVASTATIN CALCIUM 20 MG/1
20 TABLET, FILM COATED ORAL DAILY
Qty: 90 TABLET | Refills: 3 | Status: SHIPPED | OUTPATIENT
Start: 2025-02-25

## 2025-02-25 ASSESSMENT — PAIN SCALES - GENERAL: PAINLEVEL_OUTOF10: NO PAIN (0)

## 2025-02-25 NOTE — PROGRESS NOTES
"Preventive Care Visit  [facility] FRANKI Klein DNP, Geriatric Medicine  Feb 25, 2025      Assessment & Plan     Annual physical exam  Very pleasant 49-year-old man here for physical, no acute concerns. UTD on colonoscopy (due 2033). Will update annual labs and vaccines today    Coronary artery calcification of native artery  Hyperlipidemia LDL goal <100  Noted on coronary calcium CT scan, tolerating statin and baby ASA. Will provide refill, check labs for stability. Follows with cardiology  - atorvastatin (LIPITOR) 20 MG tablet; Take 1 tablet (20 mg) by mouth daily. Appointment needed for further refills  - Comprehensive metabolic panel; Future  - Lipid panel reflex to direct LDL Fasting; Future    Elevated BP without diagnosis of hypertension  Elevated initially during visit, however recheck within acceptable range. Advised to continue checking BPs occasionally at home to ensure average is <135/85 (ideal <120/80). No cardiac symptoms.   - CBC with platelets; Future    Screening for HIV (human immunodeficiency virus)  - HIV Antigen Antibody Combo; Future    Need for hepatitis C screening test  - Hepatitis C Screen Reflex to HCV RNA Quant and Genotype; Future    Screening for diabetes mellitus  - Hemoglobin A1c; Future    Screening for prostate cancer  - Prostate Specific Antigen Screen; Future    Need for vaccination  - Pneumococcal 20 Valent Conjugate (PCV20)  - HEPATITIS B, ADULT 20+ (ENGERIX-B/RECOMBIVAX HB)    Patient has been advised of split billing requirements and indicates understanding: Yes        BMI  Estimated body mass index is 25.01 kg/m  as calculated from the following:    Height as of this encounter: 1.785 m (5' 10.28\").    Weight as of this encounter: 79.7 kg (175 lb 11.2 oz).   Weight management plan: Discussed healthy diet and exercise guidelines    Counseling  Appropriate preventive services were addressed with this patient via screening, questionnaire, or discussion as " appropriate for fall prevention, nutrition, physical activity, Tobacco-use cessation, social engagement, weight loss and cognition.  Checklist reviewing preventive services available has been given to the patient.  Reviewed patient's diet, addressing concerns and/or questions.       FUTURE APPOINTMENTS:       - Make appointment with Cardiology specialist       - Follow-up for annual visit or as needed  SELF MONITORING:       - Please check blood pressure readings occasionally  Regular exercise    Subjective   Gregory is a 49 year old, presenting for the following:  Physical and Establish Care           Here for physical, no acute concerns  Sees cardiology for hx of elevated BP and cholesterol  Checks BP on occasion at home, within normal range. Wonders if it is accurate  Engages in regular physical activity                Health Care Directive  Patient does not have a Health Care Directive: Discussed advance care planning with patient; however, patient declined at this time.      2/20/2025   General Health   How would you rate your overall physical health? Good   Feel stress (tense, anxious, or unable to sleep) Only a little   (!) STRESS CONCERN      2/20/2025   Nutrition   Three or more servings of calcium each day? Yes   Diet: Low salt   How many servings of fruit and vegetables per day? (!) 0-1   How many sweetened beverages each day? 0-1         2/20/2025   Exercise   Days per week of moderate/strenous exercise 5 days   Average minutes spent exercising at this level 40 min         2/20/2025   Social Factors   Frequency of gathering with friends or relatives Twice a week   Worry food won't last until get money to buy more No   Food not last or not have enough money for food? No   Do you have housing? (Housing is defined as stable permanent housing and does not include staying ouside in a car, in a tent, in an abandoned building, in an overnight shelter, or couch-surfing.) Yes   Are you worried about losing your  housing? No   Lack of transportation? No   Unable to get utilities (heat,electricity)? No         2/20/2025   Fall Risk   Fallen 2 or more times in the past year? No   Trouble with walking or balance? No          2/20/2025   Dental   Dentist two times every year? Yes            Today's PHQ-2 Score:       2/25/2025     7:31 AM   PHQ-2 ( 1999 [employer])   Q1: Little interest or pleasure in doing things 0   Q2: Feeling down, depressed or hopeless 0   PHQ-2 Score 0    Q1: Little interest or pleasure in doing things Not at all   Q2: Feeling down, depressed or hopeless Not at all   PHQ-2 Score 0       Patient-reported           2/20/2025   Substance Use   Alcohol more than 3/day or more than 7/wk No   Do you use any other substances recreationally? (!) CANNABIS PRODUCTS     Social History     Tobacco Use    Smoking status: Former     Types: Cigarettes    Smokeless tobacco: Never    Tobacco comments:     Smoked socially in college.   Vaping Use    Vaping status: Never Used   Substance Use Topics    Alcohol use: Yes     Alcohol/week: 7.0 standard drinks of alcohol     Types: 7 Standard drinks or equivalent per week     Comment: weekends    Drug use: Never             2/20/2025   One time HIV Screening   Previous HIV test? I don't know         2/20/2025   STI Screening   New sexual partner(s) since last STI/HIV test? No   ASCVD Risk   The 10-year ASCVD risk score (Zuleika WILLINGHAM, et al., 2019) is: 1.8%    Values used to calculate the score:      Age: 49 years      Sex: Male      Is Non- : No      Diabetic: No      Tobacco smoker: No      Systolic Blood Pressure: 118 mmHg      Is BP treated: No      HDL Cholesterol: 58 mg/dL      Total Cholesterol: 154 mg/dL            2/20/2025   Contraception/Family Planning   Questions about contraception or family planning No        Reviewed and updated as needed this visit by Provider   Tobacco   Meds  Problems  Med Hx  Surg Hx  Fam Hx  Soc Hx Sexual  "  Activity          Past Medical History:   Diagnosis Date    Hypertension     Seasonal allergic rhinitis     Spring is worse    Shingles     once     Past Surgical History:   Procedure Laterality Date    ADENOIDECTOMY      PILONIDAL CYST / SINUS EXCISION      ROTATOR CUFF REPAIR RT/LT Left 01/2020     Lab work is in process      Review of Systems  Constitutional, HEENT, cardiovascular, pulmonary, gi and gu systems are negative, except as otherwise noted.     Objective    Exam  /78   Pulse 65   Temp 98.1  F (36.7  C) (Oral)   Resp 16   Ht 1.785 m (5' 10.28\")   Wt 79.7 kg (175 lb 11.2 oz)   SpO2 98%   BMI 25.01 kg/m     Estimated body mass index is 25.01 kg/m  as calculated from the following:    Height as of this encounter: 1.785 m (5' 10.28\").    Weight as of this encounter: 79.7 kg (175 lb 11.2 oz).    Physical Exam  Vitals reviewed.   Constitutional:       Appearance: Normal appearance.   HENT:      Head: Normocephalic.   Eyes:      Pupils: Pupils are equal, round, and reactive to light.   Cardiovascular:      Rate and Rhythm: Normal rate and regular rhythm.      Pulses: Normal pulses.      Heart sounds: Normal heart sounds. No murmur heard.  Pulmonary:      Effort: Pulmonary effort is normal. No respiratory distress.      Breath sounds: Normal breath sounds. No wheezing, rhonchi or rales.   Abdominal:      General: Bowel sounds are normal. There is no distension.      Palpations: Abdomen is soft. There is no mass.      Tenderness: There is no abdominal tenderness.   Musculoskeletal:         General: Normal range of motion.      Cervical back: Normal range of motion and neck supple.      Right lower leg: No edema.      Left lower leg: No edema.   Skin:     General: Skin is warm and dry.   Neurological:      Mental Status: He is alert and oriented to person, place, and time.   Psychiatric:         Attention and Perception: Attention normal.         Mood and Affect: Mood normal.         Behavior: " Behavior normal.         Cognition and Memory: Cognition normal.         Judgment: Judgment normal.               Signed Electronically by: Zandra Klein, NEELAM, APRN, CNP

## 2025-02-26 NOTE — RESULT ENCOUNTER NOTE
Adam Jenkins,    Great meeting you! All of your labs look great and within acceptable range. Please continue your current regimen.    Let me know if you have any questions or concerns,   Zandra Klein, DNP, APRN, CNP

## 2025-03-10 DIAGNOSIS — I25.84 CORONARY ARTERY CALCIFICATION OF NATIVE ARTERY: ICD-10-CM

## 2025-03-10 DIAGNOSIS — I25.10 CORONARY ARTERY CALCIFICATION OF NATIVE ARTERY: ICD-10-CM

## 2025-03-10 RX ORDER — ASPIRIN 81 MG/1
81 TABLET ORAL DAILY
Qty: 90 TABLET | Refills: 0 | Status: SHIPPED | OUTPATIENT
Start: 2025-03-10

## 2025-06-17 ENCOUNTER — OFFICE VISIT (OUTPATIENT)
Dept: CARDIOLOGY | Facility: CLINIC | Age: 50
End: 2025-06-17
Attending: INTERNAL MEDICINE
Payer: COMMERCIAL

## 2025-06-17 VITALS
HEIGHT: 70 IN | DIASTOLIC BLOOD PRESSURE: 80 MMHG | BODY MASS INDEX: 25.53 KG/M2 | HEART RATE: 56 BPM | OXYGEN SATURATION: 97 % | WEIGHT: 178.3 LBS | SYSTOLIC BLOOD PRESSURE: 128 MMHG

## 2025-06-17 DIAGNOSIS — R03.0 ELEVATED BLOOD PRESSURE READING WITHOUT DIAGNOSIS OF HYPERTENSION: Primary | ICD-10-CM

## 2025-06-17 DIAGNOSIS — I25.84 CORONARY ARTERY CALCIFICATION OF NATIVE ARTERY: ICD-10-CM

## 2025-06-17 DIAGNOSIS — I25.10 CORONARY ARTERY CALCIFICATION OF NATIVE ARTERY: ICD-10-CM

## 2025-06-17 DIAGNOSIS — E78.5 HYPERLIPIDEMIA LDL GOAL <100: ICD-10-CM

## 2025-06-17 PROCEDURE — 3079F DIAST BP 80-89 MM HG: CPT | Performed by: INTERNAL MEDICINE

## 2025-06-17 PROCEDURE — G2211 COMPLEX E/M VISIT ADD ON: HCPCS | Performed by: INTERNAL MEDICINE

## 2025-06-17 PROCEDURE — 99214 OFFICE O/P EST MOD 30 MIN: CPT | Performed by: INTERNAL MEDICINE

## 2025-06-17 PROCEDURE — 3074F SYST BP LT 130 MM HG: CPT | Performed by: INTERNAL MEDICINE

## 2025-06-17 RX ORDER — ATORVASTATIN CALCIUM 20 MG/1
20 TABLET, FILM COATED ORAL DAILY
Qty: 90 TABLET | Refills: 3 | Status: SHIPPED | OUTPATIENT
Start: 2025-06-17

## 2025-06-17 RX ORDER — ASPIRIN 81 MG/1
81 TABLET ORAL DAILY
Qty: 90 TABLET | Refills: 3 | Status: SHIPPED | OUTPATIENT
Start: 2025-06-17

## 2025-06-17 NOTE — LETTER
6/17/2025    Tobias Jacques MD  [address] [facility]  [place] [address]    RE: Tony [employer]A [employer]       Dear Colleague,     I had the pleasure of seeing Tony Meng in the [facility].  CARDIOLOGY CLINIC FOLLOW-UP NOTE      REASON FOR VISIT:   Dyslipidemia, elevated BP without diagnosis of HTN    PRIMARY CARE PHYSICIAN:  Tobias Jacques        History of Present Illness  Tony Meng is an extremely pleasant 50 year old male here for routine follow-up.  His medical history is significant only for premature but relatively mild coronary calcification, dyslipidemia and elevated BP without diagnosis of HTN.  He has no family history of heart disease outside of his father, who has heart failure which is likely related to aggressive therapy for multiple myeloma (including reportedly 5 bone marrow transplants).  He is a never smoker.  He drinks ~ 5 drinks per day on the weekends, but not during the week.  He works in supply chain management.  His current job is less stressful, but prior jobs had been very stressful.    When I first met him on 11/28/23, we discussed ways to better assess his cardiac risk, and ultimately had him do a 24-hour BP cuff and a coronary calcium scan.  The 24 hour BP cuff showed an average BP of 118/79, and his calcium score was 68.3 (0 in the LM), placing him at the 87th percentile for age/sex.  Accordingly, I started him on baby aspirin and atorvastatin 20 mg daily, which he has tolerated well.  He is exercising 5 times per week with no cardiac symptoms.    BP today in clinic is 128/80.  I reviewed his labs from 2/25/25, showing , HDL 71, LDL 93, TG 79, normal Na/K, normal Cr, normal ALT, and normal Hgb and Plt.  His ECG from 11/28/23 showed generous QRS voltage, but no strict criteria for LVH.       Assessment & Plan    Mild (but premature) coronary calcification - asymptomatic  CAC 68 (87th percentile) in 1/2024  Dyslipidemia,  with reasonable control at present  Elevated BP without diagnosis of HTN  Normal average BP on 24 hour BP cuff in 11/2023  Never smoker      It was a pleasure to speak with Gregory again in clinic today.  I am glad to hear that he continues to feel well from a cardiac standpoint, and congratulated him on his excellent exercise regimen.  At present, his BP and lipids are appropriately controlled, and I would not recommend any changes to his current regimen.  At this time, I think it is OK for him to follow with his PCP, Dr. Jacques, going forward.  I would suggest that he target an LDL under 100 at this point, and increase his statin in the future if needed to achieve this.  An LDL target under 70 is reasonable to aim for the lowest possible MACCE risk, but is probably not necessary at this time.      - Continue aspirin 81 mg daily  - Continue atorvastatin 20 mg daily for now (refill ordered).  Goal LDL < 100  - Heart healthy diet, regular aerobic exercise      Follow-up:  No routine cardiology follow-up is required at this time.  However, we would be happy to see Gregory back at any time with future questions or concerns.        Melo Bermudez MD  Interventional Cardiology  June 17, 2025        Medications  Current Outpatient Medications   Medication Sig Dispense Refill     aspirin 81 MG EC tablet Take 1 tablet (81 mg) by mouth daily. 90 tablet 0     atorvastatin (LIPITOR) 20 MG tablet Take 1 tablet (20 mg) by mouth daily. Appointment needed for further refills 90 tablet 3     cetirizine (ZYRTEC) 10 MG tablet Take 10 mg by mouth daily       tadalafil (CIALIS) 20 MG tablet Take 1 tablet (20 mg) by mouth daily as needed. 30 tablet 11     No current facility-administered medications for this visit.     Allergies  Allergies   Allergen Reactions     Dog Epithelium (Canis Lupus Familiaris) Other (See Comments)     Dust Mite Extract Other (See Comments)     Pollen Extract Other (See Comments)         Physical Exam      BP:  128/80 Pulse: 56     SpO2: 97 %      Vital Signs with Ranges  Pulse:  [56] 56  BP: (128)/(80) 128/80  SpO2:  [97 %] 97 %  178 lbs 4.8 oz    Constitutional:  Well-appearing, NAD  Respiratory: Normal respiratory effort, CTAB  Cardiovascular: RRR, no m/r/g.  JVP < 7 cm H2O.  There is no LE edema.  Normal carotid upstrokes, no carotid bruits.    Thank you for allowing me to participate in the care of your patient.      Sincerely,     Melo Bermudez MD     [facility]  cc:   Melo Bermudez MD  [address] [address]

## 2025-06-17 NOTE — PROGRESS NOTES
CARDIOLOGY CLINIC FOLLOW-UP NOTE      REASON FOR VISIT:   Dyslipidemia, elevated BP without diagnosis of HTN    PRIMARY CARE PHYSICIAN:  Tobias Jacques        History of Present Illness   Tony Meng is an extremely pleasant 50 year old male here for routine follow-up.  His medical history is significant only for premature but relatively mild coronary calcification, dyslipidemia and elevated BP without diagnosis of HTN.  He has no family history of heart disease outside of his father, who has heart failure which is likely related to aggressive therapy for multiple myeloma (including reportedly 5 bone marrow transplants).  He is a never smoker.  He drinks ~ 5 drinks per day on the weekends, but not during the week.  He works in supply chain management.  His current job is less stressful, but prior jobs had been very stressful.    When I first met him on 11/28/23, we discussed ways to better assess his cardiac risk, and ultimately had him do a 24-hour BP cuff and a coronary calcium scan.  The 24 hour BP cuff showed an average BP of 118/79, and his calcium score was 68.3 (0 in the LM), placing him at the 87th percentile for age/sex.  Accordingly, I started him on baby aspirin and atorvastatin 20 mg daily, which he has tolerated well.  He is exercising 5 times per week with no cardiac symptoms.    BP today in clinic is 128/80.  I reviewed his labs from 2/25/25, showing , HDL 71, LDL 93, TG 79, normal Na/K, normal Cr, normal ALT, and normal Hgb and Plt.  His ECG from 11/28/23 showed generous QRS voltage, but no strict criteria for LVH.       Assessment & Plan     Mild (but premature) coronary calcification - asymptomatic  CAC 68 (87th percentile) in 1/2024  Dyslipidemia, with reasonable control at present  Elevated BP without diagnosis of HTN  Normal average BP on 24 hour BP cuff in 11/2023  Never smoker      It was a pleasure to speak with Gregory again in clinic today.  I am glad to hear that he continues  to feel well from a cardiac standpoint, and congratulated him on his excellent exercise regimen.  At present, his BP and lipids are appropriately controlled, and I would not recommend any changes to his current regimen.  At this time, I think it is OK for him to follow with his PCP, Dr. Jacques, going forward.  I would suggest that he target an LDL under 100 at this point, and increase his statin in the future if needed to achieve this.  An LDL target under 70 is reasonable to aim for the lowest possible MACCE risk, but is probably not necessary at this time.      - Continue aspirin 81 mg daily  - Continue atorvastatin 20 mg daily for now (refill ordered).  Goal LDL < 100  - Heart healthy diet, regular aerobic exercise      Follow-up:  No routine cardiology follow-up is required at this time.  However, we would be happy to see Gregory back at any time with future questions or concerns.        Melo Bermudez MD  Interventional Cardiology  June 17, 2025        Medications   Current Outpatient Medications   Medication Sig Dispense Refill    aspirin 81 MG EC tablet Take 1 tablet (81 mg) by mouth daily. 90 tablet 0    atorvastatin (LIPITOR) 20 MG tablet Take 1 tablet (20 mg) by mouth daily. Appointment needed for further refills 90 tablet 3    cetirizine (ZYRTEC) 10 MG tablet Take 10 mg by mouth daily      tadalafil (CIALIS) 20 MG tablet Take 1 tablet (20 mg) by mouth daily as needed. 30 tablet 11     No current facility-administered medications for this visit.     Allergies   Allergies   Allergen Reactions    Dog Epithelium (Canis Lupus Familiaris) Other (See Comments)    Dust Mite Extract Other (See Comments)    Pollen Extract Other (See Comments)         Physical Exam       BP: 128/80 Pulse: 56     SpO2: 97 %      Vital Signs with Ranges  Pulse:  [56] 56  BP: (128)/(80) 128/80  SpO2:  [97 %] 97 %  178 lbs 4.8 oz    Constitutional:  Well-appearing, NAD  Respiratory: Normal respiratory effort, CTAB  Cardiovascular:  RRR, no m/r/g.  JVP < 7 cm H2O.  There is no LE edema.  Normal carotid upstrokes, no carotid bruits.